# Patient Record
Sex: MALE | Race: WHITE | HISPANIC OR LATINO | URBAN - METROPOLITAN AREA
[De-identification: names, ages, dates, MRNs, and addresses within clinical notes are randomized per-mention and may not be internally consistent; named-entity substitution may affect disease eponyms.]

---

## 2023-04-12 ENCOUNTER — EMERGENCY (EMERGENCY)
Facility: HOSPITAL | Age: 28
LOS: 0 days | Discharge: HOME | End: 2023-04-12
Attending: STUDENT IN AN ORGANIZED HEALTH CARE EDUCATION/TRAINING PROGRAM
Payer: MEDICAID

## 2023-04-12 VITALS
TEMPERATURE: 96 F | WEIGHT: 160.06 LBS | DIASTOLIC BLOOD PRESSURE: 80 MMHG | RESPIRATION RATE: 18 BRPM | HEART RATE: 72 BPM | SYSTOLIC BLOOD PRESSURE: 140 MMHG | OXYGEN SATURATION: 99 %

## 2023-04-12 DIAGNOSIS — S62.521B DISPLACED FRACTURE OF DISTAL PHALANX OF RIGHT THUMB, INITIAL ENCOUNTER FOR OPEN FRACTURE: ICD-10-CM

## 2023-04-12 DIAGNOSIS — Z23 ENCOUNTER FOR IMMUNIZATION: ICD-10-CM
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
## 2023-04-12 DIAGNOSIS — S69.91XA UNSPECIFIED INJURY OF RIGHT WRIST, HAND AND FINGER(S), INITIAL ENCOUNTER: ICD-10-CM
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
## 2023-04-12 PROCEDURE — 99284 EMERGENCY DEPT VISIT MOD MDM: CPT | Mod: 25

## 2023-04-12 PROCEDURE — 99283 EMERGENCY DEPT VISIT LOW MDM: CPT | Mod: 25

## 2023-04-12 PROCEDURE — 90715 TDAP VACCINE 7 YRS/> IM: CPT

## 2023-04-12 PROCEDURE — 90471 IMMUNIZATION ADMIN: CPT

## 2023-04-12 PROCEDURE — 73130 X-RAY EXAM OF HAND: CPT | Mod: 26,RT

## 2023-04-12 PROCEDURE — 73130 X-RAY EXAM OF HAND: CPT | Mod: RT

## 2023-04-12 PROCEDURE — 12001 RPR S/N/AX/GEN/TRNK 2.5CM/<: CPT

## 2023-04-12 PROCEDURE — 12041 INTMD RPR N-HF/GENIT 2.5CM/<: CPT | Mod: F5

## 2023-04-12 RX ORDER — CEPHALEXIN 500 MG
1 CAPSULE ORAL
Qty: 21 | Refills: 0
Start: 2023-04-12 | End: 2023-04-18

## 2023-04-12 RX ORDER — TETANUS TOXOID, REDUCED DIPHTHERIA TOXOID AND ACELLULAR PERTUSSIS VACCINE, ADSORBED 5; 2.5; 8; 8; 2.5 [IU]/.5ML; [IU]/.5ML; UG/.5ML; UG/.5ML; UG/.5ML
0.5 SUSPENSION INTRAMUSCULAR ONCE
Refills: 0 | Status: COMPLETED | OUTPATIENT
Start: 2023-04-12 | End: 2023-04-12

## 2023-04-12 RX ADMIN — TETANUS TOXOID, REDUCED DIPHTHERIA TOXOID AND ACELLULAR PERTUSSIS VACCINE, ADSORBED 0.5 MILLILITER(S): 5; 2.5; 8; 8; 2.5 SUSPENSION INTRAMUSCULAR at 17:04

## 2023-04-12 NOTE — ED PROVIDER NOTE - CLINICAL SUMMARY MEDICAL DECISION MAKING FREE TEXT BOX
27-year-old male presented today with laceration.  Patient is hemodynamically stable and neurologically intact at this time.  Patient appears to have an open fracture of the distal phalanx thumb.  Patient started on antibiotics, wound was managed and nailbed laceration was repaired.  Patient instructed to follow-up with hand surgery.  Return precautions explained to patient.  Patient started on prophylactic antibiotics.  Patient also instructed to return immediately if he develops swelling, fevers, chills, purulent drainage from the area.  Patient understands return precautions and will follow-up accordingly.

## 2023-04-12 NOTE — ED PROVIDER NOTE - PROGRESS NOTE DETAILS
Spoke with the patient using interpretation services, #[id number] made aware of distal tuft fracture and need for prompt hand follow-up.  Patient provided information for hand clinic.  Patient started on cephalexin prophylactically.  Bulky dressing applied to wound and patient given multiple supplies to dress wound himself at home for the next few days.

## 2023-04-12 NOTE — ED PROVIDER NOTE - PATIENT PORTAL LINK FT
You can access the FollowMyHealth Patient Portal offered by [facility] by registering at the following website: [email or web address]. By joining [facility]’s FollowMyHealth portal, you will also be able to view your health information using other applications (apps) compatible with our system.

## 2023-04-12 NOTE — ED PROCEDURE NOTE - NS ED ATTENDING STATEMENT MOD
This was a shared visit with the SINDY. I reviewed and verified the documentation and independently performed the documented:

## 2023-04-12 NOTE — ED PROVIDER NOTE - NSFOLLOWUPCLINICS_GEN_ALL_ED_FT
[facility]  Hand  [address], [address]  [place] [address]  Phone: [phone]  Fax:

## 2023-04-12 NOTE — ED PROVIDER NOTE - PHYSICAL EXAMINATION
VITAL SIGNS: I have reviewed nursing notes and confirm.  CONSTITUTIONAL: Well-developed; well-nourished; in no acute distress.   SKIN: Avulsion injury to right distal phalanx of thumb Remainder of skin exam is warm and dry, no acute rash.    HEAD: Normocephalic; atraumatic.  EYES: conjunctiva and sclera clear.  ENT: Full range of motion of affected digit  No nasal discharge; airway clear.  EXT: Normal ROM.  No clubbing, cyanosis or edema.   NEURO: Alert, oriented, grossly unremarkable

## 2023-04-12 NOTE — ED PROCEDURE NOTE - ATTENDING APP SHARED VISIT CONTRIBUTION OF CARE
pt awake alert roiented x 3not in dsitress,refused venous blood gas. pt awake alert roiented x 3not in dsitress,refused venous blood gas.dr ariza textpaged I was present for and supervised the key and critical aspects of the procedures performed during the care of the patient.

## 2023-04-12 NOTE — ED PROVIDER NOTE - OBJECTIVE STATEMENT
27-year-old male no medical history here for evaluation of right thumb injury from hedge cutter while at work earlier today.  Patient denies weakness,

## 2024-11-19 NOTE — ED ADULT TRIAGE NOTE - BP NONINVASIVE SYSTOLIC (MM HG)
[FreeTextEntry1] : 1) LVH without HTN Investigate why conc LVH with normal BP R/O DEION Sleep Study DEION causing PVC arrythmia and HTN  2) Palpitations. Revisit Dr Rodriguez possible Holter 3) LVH and mild Aortic Regurgitation  F/U ECHO ECG NSR no LVH as seen on echo  140 No

## 2025-02-21 NOTE — ED PROVIDER NOTE - WR ORDER DATE AND TIME 1
CRS  Pt with bms. Hungry. Pain a little better controlled.\"I want to go home\". Feels claustrophobic     Flowsheet, labs reviewed  Abd: soft, less tender    Inc: c/d/I  Drain: serosang    Plan:  Adv to low fiber diet, dc IVF. If able to tolerate bkft and lunch, possible to go home late afternoon. Otherwise, will keep another day. Drain will be removed prior to dc home   12-Apr-2023 16:50